# Patient Record
(demographics unavailable — no encounter records)

---

## 2024-10-16 NOTE — PROCEDURE
[FreeTextEntry1] : ext anal exam-no masses anoscopy-large IH circumferentially  Rubber band ligation was performed in standard fashion under direct vision with a standard rubber band ligator.  The right posterior hemorrhoid was treated without event

## 2024-10-16 NOTE — ASSESSMENT
[FreeTextEntry1] : Large bleeding and prolapsing hemorrhoids -RBL performed as described -f/u in 2 weeks for additional banding.  Hemorrhoids are large and would continue banding as long pt continues to noted improvement -fiber supplement

## 2024-10-31 NOTE — ASSESSMENT
[FreeTextEntry1] : Bleeding and prolapsing internal hemorrhoids -Patient with large internal hemorrhoids status post rubber band ligation x3. The hemorrhoids are large enough for additional banding of each column. Patient is not interested in surgical excision at this time and would like to proceed with additional banding -I believe this is a reasonable option as long as consistent improvement is noted -Patient followup in 2 weeks for additional banding -Rubber band ligation performed as described

## 2024-10-31 NOTE — PROCEDURE
[FreeTextEntry1] : ext anal exam no masses anoscopy-large circumferential hemorrhoids.    Rubber band ligation was performed in standard fashion under direct vision with the standard rubber band ligator. The right posterior hemorrhoid was treated without event or complication

## 2024-12-17 NOTE — PROCEDURE
[FreeTextEntry1] : External anal exam no masses or lesions Digital rectal exam normal tone Anoscopy large hemorrhoids appreciated in the right posterior and left posterior region  Rubber band ligation was performed in standard fashion under direct vision with a standard rubber band ligator. The left posterior hemorrhoid was treated without event or complication

## 2024-12-17 NOTE — HISTORY OF PRESENT ILLNESS
[FreeTextEntry1] : Large internal hemorrhoids status post multiple rubber band ligation. Patient reports persistent improvement and prolapse and cessation of bleeding.

## 2024-12-17 NOTE — ASSESSMENT
[FreeTextEntry1] : Prolapsing hemorrhoids -Bleeding is almost completely stopped with persistent but improved prolapse -Continue banding until symptoms resolve -Patient followup as needed -Fiber diet

## 2025-01-08 NOTE — ASSESSMENT
[FreeTextEntry1] : bleeding hemorrhoids -RBL performed as described -High fiber diet -f/u with persistent recurrent bleeding

## 2025-01-08 NOTE — PROCEDURE
[FreeTextEntry1] : ext anal exam no masses anoscopy-mod residual hemorrhoids circumferentially  Rubber band ligation was performed in standard fashion under direct vision with a standard rubber band ligator or the right posterior internal hemorrhoid.  procedure performed without complication